# Patient Record
Sex: FEMALE | Employment: OTHER | URBAN - METROPOLITAN AREA
[De-identification: names, ages, dates, MRNs, and addresses within clinical notes are randomized per-mention and may not be internally consistent; named-entity substitution may affect disease eponyms.]

---

## 2020-09-29 ENCOUNTER — NEW PATIENT (OUTPATIENT)
Dept: URBAN - METROPOLITAN AREA CLINIC 11 | Facility: CLINIC | Age: 75
End: 2020-09-29

## 2020-09-29 ASSESSMENT — VISUAL ACUITY
OS_PH: 20/40-1
OD_SC: 20/40+2
OS_SC: 20/50-1

## 2020-09-29 ASSESSMENT — TONOMETRY
OD_IOP_MMHG: 13
OS_IOP_MMHG: 12

## 2020-09-29 NOTE — PATIENT DISCUSSION
Greater than 50% of exam spent in face to face dialogue with Mrs. Raghav Martinez and her . I have explained that she has a dense membrane on the surface of her left retina. I have recommended vitrectomy surgery to remove the visually significant membrane. All questions and concerns addressed at length and she would like to schedule in the near future.